# Patient Record
Sex: FEMALE | Employment: PART TIME | URBAN - METROPOLITAN AREA
[De-identification: names, ages, dates, MRNs, and addresses within clinical notes are randomized per-mention and may not be internally consistent; named-entity substitution may affect disease eponyms.]

---

## 2017-08-02 ENCOUNTER — APPOINTMENT (OUTPATIENT)
Dept: GENERAL RADIOLOGY | Age: 57
End: 2017-08-02
Attending: EMERGENCY MEDICINE
Payer: COMMERCIAL

## 2017-08-02 ENCOUNTER — HOSPITAL ENCOUNTER (EMERGENCY)
Age: 57
Discharge: HOME | End: 2017-08-02
Attending: EMERGENCY MEDICINE | Admitting: EMERGENCY MEDICINE
Payer: COMMERCIAL

## 2017-08-02 ENCOUNTER — APPOINTMENT (OUTPATIENT)
Dept: NUCLEAR MEDICINE | Age: 57
End: 2017-08-02
Attending: EMERGENCY MEDICINE
Payer: COMMERCIAL

## 2017-08-02 VITALS
HEART RATE: 67 BPM | OXYGEN SATURATION: 100 % | WEIGHT: 164 LBS | BODY MASS INDEX: 28 KG/M2 | DIASTOLIC BLOOD PRESSURE: 73 MMHG | HEIGHT: 64 IN | TEMPERATURE: 98.6 F | SYSTOLIC BLOOD PRESSURE: 124 MMHG | RESPIRATION RATE: 17 BRPM

## 2017-08-02 DIAGNOSIS — R07.9 CHEST PAIN, UNSPECIFIED TYPE: Primary | ICD-10-CM

## 2017-08-02 DIAGNOSIS — R94.31 ABNORMAL EKG: ICD-10-CM

## 2017-08-02 LAB
ANION GAP BLD CALC-SCNC: 9 MMOL/L (ref 3–18)
ATRIAL RATE: 58 BPM
BASOPHILS # BLD AUTO: 0 K/UL (ref 0–0.06)
BASOPHILS # BLD: 0 % (ref 0–2)
BUN SERPL-MCNC: 14 MG/DL (ref 7–18)
BUN/CREAT SERPL: 18 (ref 12–20)
CALCIUM SERPL-MCNC: 9.4 MG/DL (ref 8.5–10.1)
CALCULATED P AXIS, ECG09: 41 DEGREES
CALCULATED R AXIS, ECG10: 47 DEGREES
CALCULATED T AXIS, ECG11: -131 DEGREES
CHLORIDE SERPL-SCNC: 105 MMOL/L (ref 100–108)
CK MB CFR SERPL CALC: 0.6 % (ref 0–4)
CK MB SERPL-MCNC: 2 NG/ML (ref 5–25)
CK SERPL-CCNC: 341 U/L (ref 26–192)
CO2 SERPL-SCNC: 27 MMOL/L (ref 21–32)
CREAT SERPL-MCNC: 0.8 MG/DL (ref 0.6–1.3)
DIAGNOSIS, 93000: NORMAL
DIFFERENTIAL METHOD BLD: ABNORMAL
EOSINOPHIL # BLD: 0.2 K/UL (ref 0–0.4)
EOSINOPHIL NFR BLD: 4 % (ref 0–5)
ERYTHROCYTE [DISTWIDTH] IN BLOOD BY AUTOMATED COUNT: 13.5 % (ref 11.6–14.5)
GLUCOSE SERPL-MCNC: 98 MG/DL (ref 74–99)
HCT VFR BLD AUTO: 37.8 % (ref 35–45)
HGB BLD-MCNC: 12.6 G/DL (ref 12–16)
LYMPHOCYTES # BLD AUTO: 68 % (ref 21–52)
LYMPHOCYTES # BLD: 3.1 K/UL (ref 0.9–3.6)
MCH RBC QN AUTO: 29.7 PG (ref 24–34)
MCHC RBC AUTO-ENTMCNC: 33.3 G/DL (ref 31–37)
MCV RBC AUTO: 89.2 FL (ref 74–97)
MONOCYTES # BLD: 0.3 K/UL (ref 0.05–1.2)
MONOCYTES NFR BLD AUTO: 6 % (ref 3–10)
NEUTS SEG # BLD: 1 K/UL (ref 1.8–8)
NEUTS SEG NFR BLD AUTO: 22 % (ref 40–73)
P-R INTERVAL, ECG05: 126 MS
PLATELET # BLD AUTO: 219 K/UL (ref 135–420)
PMV BLD AUTO: 11.4 FL (ref 9.2–11.8)
POTASSIUM SERPL-SCNC: 4.4 MMOL/L (ref 3.5–5.5)
Q-T INTERVAL, ECG07: 450 MS
QRS DURATION, ECG06: 84 MS
QTC CALCULATION (BEZET), ECG08: 441 MS
RBC # BLD AUTO: 4.24 M/UL (ref 4.2–5.3)
SODIUM SERPL-SCNC: 141 MMOL/L (ref 136–145)
TROPONIN I BLD-MCNC: <0.04 NG/ML (ref 0–0.08)
TROPONIN I SERPL-MCNC: <0.02 NG/ML (ref 0–0.04)
VENTRICULAR RATE, ECG03: 58 BPM
WBC # BLD AUTO: 4.6 K/UL (ref 4.6–13.2)

## 2017-08-02 PROCEDURE — 99285 EMERGENCY DEPT VISIT HI MDM: CPT

## 2017-08-02 PROCEDURE — 82550 ASSAY OF CK (CPK): CPT | Performed by: EMERGENCY MEDICINE

## 2017-08-02 PROCEDURE — A9500 TC99M SESTAMIBI: HCPCS

## 2017-08-02 PROCEDURE — 80048 BASIC METABOLIC PNL TOTAL CA: CPT | Performed by: EMERGENCY MEDICINE

## 2017-08-02 PROCEDURE — 93017 CV STRESS TEST TRACING ONLY: CPT | Performed by: EMERGENCY MEDICINE

## 2017-08-02 PROCEDURE — 85025 COMPLETE CBC W/AUTO DIFF WBC: CPT | Performed by: EMERGENCY MEDICINE

## 2017-08-02 PROCEDURE — 71010 XR CHEST PORT: CPT

## 2017-08-02 PROCEDURE — 93005 ELECTROCARDIOGRAM TRACING: CPT

## 2017-08-02 PROCEDURE — 84484 ASSAY OF TROPONIN QUANT: CPT | Performed by: EMERGENCY MEDICINE

## 2017-08-02 NOTE — LETTER
[facility] EMERGENCY DEPT 
[address] [id number] 
[phone] Work Note Date: 8/2/2017 To Whom It May concern: 
 
Arelis Stapleton was seen and treated today in the emergency room by the following provider(s): 
Attending Provider: Ale Nielson MD.   
 
Arelis Stapleton may return to work on 8/4/17. Sincerely, Ale Nielson MD

## 2017-08-02 NOTE — ED TRIAGE NOTES
Chest tightness onset today upon waking, woke up today with dizziness, and SOB, (CP x one month, other symptoms new today)

## 2017-08-02 NOTE — LETTER
[facility] EMERGENCY DEPT 
[address] [facility] [address] [id number] 
[phone] Work Note Date: 8/2/2017 To Whom It May concern: 
 
Marla Oconnor was seen and treated today in the emergency room by the following provider(s): 
Attending Provider: Debora Narvaez MD.   
 
Marla Oconnor may return to work on 8/3/17. Sincerely, Debora Narvaez MD

## 2017-08-02 NOTE — DISCHARGE INSTRUCTIONS
Chest Pain: Care Instructions  Your Care Instructions  There are many things that can cause chest pain. Some are not serious and will get better on their own in a few days. But some kinds of chest pain need more testing and treatment. Your doctor may have recommended a follow-up visit in the next 8 to 12 hours. If you are not getting better, you may need more tests or treatment. Even though your doctor has released you, you still need to watch for any problems. The doctor carefully checked you, but sometimes problems can develop later. If you have new symptoms or if your symptoms do not get better, get medical care right away. If you have worse or different chest pain or pressure that lasts more than 5 minutes or you passed out (lost consciousness), call 911 or seek other emergency help right away. A medical visit is only one step in your treatment. Even if you feel better, you still need to do what your doctor recommends, such as going to all suggested follow-up appointments and taking medicines exactly as directed. This will help you recover and help prevent future problems. How can you care for yourself at home? · Rest until you feel better. · Take your medicine exactly as prescribed. Call your doctor if you think you are having a problem with your medicine. · Do not drive after taking a prescription pain medicine. When should you call for help? Call 911 if:  · You passed out (lost consciousness). · You have severe difficulty breathing. · You have symptoms of a heart attack. These may include:  ¨ Chest pain or pressure, or a strange feeling in your chest.  ¨ Sweating. ¨ Shortness of breath. ¨ Nausea or vomiting. ¨ Pain, pressure, or a strange feeling in your back, neck, jaw, or upper belly or in one or both shoulders or arms. ¨ Lightheadedness or sudden weakness. ¨ A fast or irregular heartbeat.   After you call 911, the  may tell you to chew 1 adult-strength or 2 to 4 low-dose aspirin. Wait for an ambulance. Do not try to drive yourself. Call your doctor today if:  · You have any trouble breathing. · Your chest pain gets worse. · You are dizzy or lightheaded, or you feel like you may faint. · You are not getting better as expected. · You are having new or different chest pain. Where can you learn more? Go to http://milly-sukhdev.info/. Enter A120 in the search box to learn more about \"Chest Pain: Care Instructions. \"  Current as of: March 20, 2017  Content Version: 11.3  © 2014-3975 [employer]. Care instructions adapted under license by [employer] (which disclaims liability or warranty for this information). If you have questions about a medical condition or this instruction, always ask your healthcare professional. [address] any warranty or liability for your use of this information.

## 2017-08-02 NOTE — ED PROVIDER NOTES
[facility]  EMERGENCY DEPARTMENT HISTORY AND PHYSICAL EXAM       Date: 8/2/2017   Patient Name: Lio Nunez   YOB: 1960  Medical Record Number: [id number]    History of Presenting Illness     Chief Complaint   Patient presents with    Chest Pain    Dizziness    Shortness of Breath        History Provided By:  Patient     Additional History:   11:27 AM   Lio Nunez is a 55 y.o. female presenting to the ED c/o intermittent chest tightness and CP x 1 month, last episode occurring this AM. States pain is now resolved in ED. Associated sxs include dizziness, lightheadedness, palpitations, SOB, increased fatigue, and mild HA. Reports extensive familial cardiac hx. Denies personal hx of HTN and DM. Denies abd pain, nausea, vomiting, use of hormonal birth control, and any other sxs or complaints. Primary Care Provider: Anayeli Mosquera RN   Specialist:    Past History     Past Medical History:   Past Medical History:   Diagnosis Date    Screening for colon cancer 5/8/2013        Past Surgical History:   Past Surgical History:   Procedure Laterality Date    HX CYST REMOVAL          Social History:   Social History   Substance Use Topics    Smoking status: Never Smoker    Smokeless tobacco: Never Used    Alcohol use No        Allergies: Allergies   Allergen Reactions    Epinephrine Anaphylaxis    Pcn [Penicillins] Anaphylaxis    Iodinated Contrast- Oral And Iv Dye Seizures    Caffeine Other (comments)     Stomach upset        Review of Systems   Review of Systems   Constitutional: Positive for fatigue. Negative for diaphoresis and fever. HENT: Negative for congestion and sore throat. Eyes: Negative for pain and itching. Respiratory: Positive for chest tightness and shortness of breath. Negative for cough. Cardiovascular: Positive for chest pain and palpitations. Gastrointestinal: Negative for abdominal pain, diarrhea, nausea and vomiting. Endocrine: Negative for polydipsia and polyuria. Genitourinary: Negative for dysuria and hematuria. Musculoskeletal: Negative for arthralgias and myalgias. Skin: Negative for rash and wound. Neurological: Positive for dizziness, light-headedness and headaches. Negative for seizures and syncope. Hematological: Does not bruise/bleed easily. Psychiatric/Behavioral: Negative for agitation and hallucinations. Physical Exam  Vitals:    08/02/17 1622 08/02/17 1623 08/02/17 1624 08/02/17 1625   BP:       Pulse: 64 66 67 67   Resp: 18 17 17 17   Temp:       SpO2: 97% 99% 100% 100%   Weight:       Height:           Physical Exam   Constitutional: She appears well-developed and well-nourished. HENT:   Head: Normocephalic and atraumatic. Eyes: Conjunctivae are normal. No scleral icterus. Neck: Normal range of motion. Neck supple. No JVD present. Cardiovascular: Normal rate, regular rhythm and normal heart sounds. 4 intact extremity pulses   Pulmonary/Chest: Effort normal and breath sounds normal. No respiratory distress. She exhibits no tenderness. Abdominal: Soft. She exhibits no mass. There is no tenderness. Musculoskeletal: Normal range of motion. Lymphadenopathy:     She has no cervical adenopathy. Neurological: She is alert. Skin: Skin is warm and dry. Nursing note and vitals reviewed. Diagnostic Study Results     Concern for ACS. PERC 1 - doubt PE. Pain has resolved spontaneously.      Labs -      Recent Results (from the past 12 hour(s))   EKG, 12 LEAD, INITIAL    Collection Time: 08/02/17 11:16 AM   Result Value Ref Range    Ventricular Rate 58 BPM    Atrial Rate 58 BPM    P-R Interval 126 ms    QRS Duration 84 ms    Q-T Interval 450 ms    QTC Calculation (Bezet) 441 ms    Calculated P Axis 41 degrees    Calculated R Axis 47 degrees    Calculated T Axis -131 degrees    Diagnosis       Sinus bradycardia  Left ventricular hypertrophy with repolarization abnormality  Non-specific ST/T wave changes , probably from LVH  Abnormal ECG  When compared with ECG of 26-OCT-2012 12:06,  T wave inversion more evident in Lateral leads  Confirmed by Rayna Irving ([id number]) on 8/2/2017 1:58:50 PM     CBC WITH AUTOMATED DIFF    Collection Time: 08/02/17 11:36 AM   Result Value Ref Range    WBC 4.6 4.6 - 13.2 K/uL    RBC 4.24 4.20 - 5.30 M/uL    HGB 12.6 12.0 - 16.0 g/dL    HCT 37.8 35.0 - 45.0 %    MCV 89.2 74.0 - 97.0 FL    MCH 29.7 24.0 - 34.0 PG    MCHC 33.3 31.0 - 37.0 g/dL    RDW 13.5 11.6 - 14.5 %    PLATELET 869 191 - 194 K/uL    MPV 11.4 9.2 - 11.8 FL    NEUTROPHILS 22 (L) 40 - 73 %    LYMPHOCYTES 68 (H) 21 - 52 %    MONOCYTES 6 3 - 10 %    EOSINOPHILS 4 0 - 5 %    BASOPHILS 0 0 - 2 %    ABS. NEUTROPHILS 1.0 (L) 1.8 - 8.0 K/UL    ABS. LYMPHOCYTES 3.1 0.9 - 3.6 K/UL    ABS. MONOCYTES 0.3 0.05 - 1.2 K/UL    ABS. EOSINOPHILS 0.2 0.0 - 0.4 K/UL    ABS.  BASOPHILS 0.0 0.0 - 0.06 K/UL    DF AUTOMATED     METABOLIC PANEL, BASIC    Collection Time: 08/02/17 11:36 AM   Result Value Ref Range    Sodium 141 136 - 145 mmol/L    Potassium 4.4 3.5 - 5.5 mmol/L    Chloride 105 100 - 108 mmol/L    CO2 27 21 - 32 mmol/L    Anion gap 9 3.0 - 18 mmol/L    Glucose 98 74 - 99 mg/dL    BUN 14 7.0 - 18 MG/DL    Creatinine 0.80 0.6 - 1.3 MG/DL    BUN/Creatinine ratio 18 12 - 20      GFR est AA >60 >60 ml/min/1.73m2    GFR est non-AA >60 >60 ml/min/1.73m2    Calcium 9.4 8.5 - 10.1 MG/DL   CARDIAC PANEL,(CK, CKMB & TROPONIN)    Collection Time: 08/02/17 11:36 AM   Result Value Ref Range     (H) 26 - 192 U/L    CK - MB 2.0 <3.6 ng/ml    CK-MB Index 0.6 0.0 - 4.0 %    Troponin-I, Qt. <0.02 0.0 - 0.045 NG/ML   EKG, 12 LEAD, SUBSEQUENT    Collection Time: 08/02/17 11:56 AM   Result Value Ref Range    Ventricular Rate 57 BPM    Atrial Rate 57 BPM    P-R Interval 122 ms    QRS Duration 84 ms    Q-T Interval 448 ms    QTC Calculation (Bezet) 436 ms    Calculated P Axis 44 degrees    Calculated R Axis 45 degrees    Calculated T Axis -104 degrees    Diagnosis       Sinus bradycardia  Voltage criteria for left ventricular hypertrophy  ST & Marked T wave abnormality, consider anterolateral ischemia  Abnormal ECG  When compared with ECG of 02-AUG-2017 11:16,  No significant change was found     POC TROPONIN-I    Collection Time: 08/02/17  3:06 PM   Result Value Ref Range    Troponin-I (POC) <0.04 0.00 - 0.08 ng/mL       Radiologic Studies -    XR CHEST PORT   Final Result   IMPRESSION:  1. Mild to moderate enlargement of cardiac silhouette. This is stable compared  to prior imaging. 2. No other evidence of acute cardiopulmonary abnormality.      As read by the radiologist.        NM CARDIAC SPECT W STRS/REST MULT   Final Result   SUMMARY/IMPRESSION   1. Exercise nuclear stress test using Colten treadmill protocol. 2. Ischemic changes: Indeterminant because of baseline ST-T changes and  repolarization abnormality. Ambriz treadmill score of 9, low risk   3. There was no convincing evidence of significant reversible or fixed defect  noted to suggest ongoing major ischemia or prior infarct  4. Calculated ejection fraction of 77% on gated images. Wall motion: Normal.  End-diastolic volume of 62 mL. 5. Low risk stress test    As read by the radiologist.          Medical Decision Making   I am the first provider for this patient. I reviewed the vital signs, available nursing notes, past medical history, past surgical history, family history and social history. Vital Signs-Reviewed the patient's vital signs.    Patient Vitals for the past 12 hrs:   Temp Pulse Resp BP SpO2   08/02/17 1625 - 67 17 - 100 %   08/02/17 1624 - 67 17 - 100 %   08/02/17 1623 - 66 17 - 99 %   08/02/17 1622 - 64 18 - 97 %   08/02/17 1621 - 62 19 - 98 %   08/02/17 1350 - 69 21 - 100 %   08/02/17 1349 - 72 16 - 99 %   08/02/17 1348 - (!) 56 17 - 99 %   08/02/17 1347 - (!) 57 17 - 99 %   08/02/17 1346 - 60 18 - 100 %   08/02/17 1345 - (!) 56 16 124/73 100 %   08/02/17 1116 98.6 °F (37 °C) 62 16 125/74 100 %   08/02/17 1115 - (!) 58 17 - 100 %   08/02/17 1114 - 60 14 - 100 %   08/02/17 1113 - (!) 58 16 - 100 %   08/02/17 1112 - 60 19 - 100 %       EKG interpretation: (Preliminary)  Sinus bradycardia at 58 bpm. Inverted T waves anterior and lateral.   EKG read by Arleen Ortega MD at 11:16 AM    Old Medical Records: Nursing notes. Provider Notes:        Procedures:     ED Course:      11:27 AM   Initial assessment performed. 12:36 PM  Discussed patient's history, exam, and available diagnostics results with Elizabeth Sen MD, cardiology, who feels pt is low risk, and will follow upon admission. 1:38 PM  Discussed patient's history, exam, and available diagnostics results with Bernarda Joseph MD, internal medicine, who agree with admission. Will evaluate pt and set up for stress testing, if neg anticipate DC.      1:46 PM  Patient is being admitted to the hospital by Bernarda Joseph MD. The results of their tests and reasons for their admission have been discussed with them and/or available family. They convey agreement and understanding for the need to be admitted and for their admission diagnosis. 4:43 PM stress test neg. Will dc home. Discussed findings with pt, questions answered, she's happy with plan for DC at this point. Medications   technetium sestamibi (CARDIOLITE) injection 12 millicurie (12 millicuries IntraVENous Given 8/2/17 1455)   technetium sestamibi (CARDIOLITE) injection 87.6 millicurie (81.5 millicuries IntraVENous Given 8/2/17 1552)         Diagnosis   Clinical Impression:   1. Chest pain, unspecified type    2.  Abnormal EKG       _______________________________   Attestations:     SCRIBE ATTESTATION STATEMENT  Documented by: lisa Moore for and in the presence of Arleen Ortega MD.     Signed by: Lauryn Moore, 08/02/17 11:27 AM     PROVIDER ATTESTATION STATEMENT  I personally performed the services described in the documentation, reviewed the documentation, as recorded by the scribe in my presence, and it accurately and completely records my words and actions.   Adriano Vogt MD.      _______________________________

## 2017-08-03 LAB
ATRIAL RATE: 57 BPM
CALCULATED P AXIS, ECG09: 44 DEGREES
CALCULATED R AXIS, ECG10: 45 DEGREES
CALCULATED T AXIS, ECG11: -104 DEGREES
DIAGNOSIS, 93000: NORMAL
P-R INTERVAL, ECG05: 122 MS
Q-T INTERVAL, ECG07: 448 MS
QRS DURATION, ECG06: 84 MS
QTC CALCULATION (BEZET), ECG08: 436 MS
VENTRICULAR RATE, ECG03: 57 BPM